# Patient Record
Sex: MALE | Race: WHITE | NOT HISPANIC OR LATINO | Employment: FULL TIME | URBAN - NONMETROPOLITAN AREA
[De-identification: names, ages, dates, MRNs, and addresses within clinical notes are randomized per-mention and may not be internally consistent; named-entity substitution may affect disease eponyms.]

---

## 2024-07-10 ENCOUNTER — LAB (OUTPATIENT)
Dept: FAMILY MEDICINE CLINIC | Facility: CLINIC | Age: 59
End: 2024-07-10
Payer: COMMERCIAL

## 2024-07-10 ENCOUNTER — OFFICE VISIT (OUTPATIENT)
Dept: FAMILY MEDICINE CLINIC | Facility: CLINIC | Age: 59
End: 2024-07-10
Payer: COMMERCIAL

## 2024-07-10 VITALS
TEMPERATURE: 98 F | SYSTOLIC BLOOD PRESSURE: 142 MMHG | RESPIRATION RATE: 18 BRPM | OXYGEN SATURATION: 98 % | WEIGHT: 217 LBS | HEART RATE: 83 BPM | DIASTOLIC BLOOD PRESSURE: 88 MMHG

## 2024-07-10 DIAGNOSIS — Z23 NEED FOR ZOSTER VACCINATION: ICD-10-CM

## 2024-07-10 DIAGNOSIS — K92.1 BLOOD IN STOOL: ICD-10-CM

## 2024-07-10 DIAGNOSIS — Z12.5 PROSTATE CANCER SCREENING: ICD-10-CM

## 2024-07-10 DIAGNOSIS — Z00.00 WELL ADULT EXAM: ICD-10-CM

## 2024-07-10 DIAGNOSIS — K59.04 CHRONIC IDIOPATHIC CONSTIPATION: ICD-10-CM

## 2024-07-10 DIAGNOSIS — Z00.00 WELL ADULT EXAM: Primary | ICD-10-CM

## 2024-07-10 DIAGNOSIS — Z12.11 COLON CANCER SCREENING: ICD-10-CM

## 2024-07-10 LAB
BILIRUB UR QL STRIP: NEGATIVE
CLARITY UR: ABNORMAL
COLOR UR: YELLOW
DEPRECATED RDW RBC AUTO: 45.9 FL (ref 37–54)
ERYTHROCYTE [DISTWIDTH] IN BLOOD BY AUTOMATED COUNT: 13.4 % (ref 12.3–15.4)
GLUCOSE UR STRIP-MCNC: NEGATIVE MG/DL
HBA1C MFR BLD: 5.6 % (ref 4.8–5.6)
HCT VFR BLD AUTO: 45.5 % (ref 37.5–51)
HCV AB SER QL: NORMAL
HGB BLD-MCNC: 15.1 G/DL (ref 13–17.7)
HGB UR QL STRIP.AUTO: NEGATIVE
KETONES UR QL STRIP: NEGATIVE
LEUKOCYTE ESTERASE UR QL STRIP.AUTO: NEGATIVE
MCH RBC QN AUTO: 30.8 PG (ref 26.6–33)
MCHC RBC AUTO-ENTMCNC: 33.2 G/DL (ref 31.5–35.7)
MCV RBC AUTO: 92.7 FL (ref 79–97)
NITRITE UR QL STRIP: NEGATIVE
PH UR STRIP.AUTO: 7 [PH] (ref 5–8)
PLATELET # BLD AUTO: 314 10*3/MM3 (ref 140–450)
PMV BLD AUTO: 8.8 FL (ref 6–12)
PROT UR QL STRIP: NEGATIVE
PSA SERPL-MCNC: 0.5 NG/ML (ref 0–4)
RBC # BLD AUTO: 4.91 10*6/MM3 (ref 4.14–5.8)
SP GR UR STRIP: 1.02 (ref 1–1.03)
TSH SERPL DL<=0.05 MIU/L-ACNC: 1.77 UIU/ML (ref 0.27–4.2)
UROBILINOGEN UR QL STRIP: ABNORMAL
VIT B12 BLD-MCNC: 747 PG/ML (ref 211–946)
WBC NRBC COR # BLD AUTO: 8.21 10*3/MM3 (ref 3.4–10.8)

## 2024-07-10 PROCEDURE — 99213 OFFICE O/P EST LOW 20 MIN: CPT | Performed by: FAMILY MEDICINE

## 2024-07-10 PROCEDURE — 82607 VITAMIN B-12: CPT | Performed by: FAMILY MEDICINE

## 2024-07-10 PROCEDURE — 99386 PREV VISIT NEW AGE 40-64: CPT | Performed by: FAMILY MEDICINE

## 2024-07-10 PROCEDURE — 80061 LIPID PANEL: CPT | Performed by: FAMILY MEDICINE

## 2024-07-10 PROCEDURE — 36415 COLL VENOUS BLD VENIPUNCTURE: CPT | Performed by: FAMILY MEDICINE

## 2024-07-10 PROCEDURE — 86803 HEPATITIS C AB TEST: CPT | Performed by: FAMILY MEDICINE

## 2024-07-10 PROCEDURE — G0103 PSA SCREENING: HCPCS | Performed by: FAMILY MEDICINE

## 2024-07-10 PROCEDURE — 84466 ASSAY OF TRANSFERRIN: CPT | Performed by: FAMILY MEDICINE

## 2024-07-10 PROCEDURE — 82728 ASSAY OF FERRITIN: CPT | Performed by: FAMILY MEDICINE

## 2024-07-10 PROCEDURE — 81003 URINALYSIS AUTO W/O SCOPE: CPT | Performed by: FAMILY MEDICINE

## 2024-07-10 PROCEDURE — 83540 ASSAY OF IRON: CPT | Performed by: FAMILY MEDICINE

## 2024-07-10 PROCEDURE — 83036 HEMOGLOBIN GLYCOSYLATED A1C: CPT | Performed by: FAMILY MEDICINE

## 2024-07-10 PROCEDURE — 80050 GENERAL HEALTH PANEL: CPT | Performed by: FAMILY MEDICINE

## 2024-07-10 RX ORDER — POLYETHYLENE GLYCOL 3350 17 G/17G
17 POWDER, FOR SOLUTION ORAL DAILY
Qty: 850 G | Refills: 11 | Status: SHIPPED | OUTPATIENT
Start: 2024-07-10

## 2024-07-10 NOTE — PROGRESS NOTES
Male Physical Note      Date: 07/10/2024   Patient Name: James Jalloh  : 1965   MRN: [id number]     Chief Complaint:    Chief Complaint   Patient presents with    Establish Care    Hemorrhoids     Hard stool and blood then had had soft stool and blood. States that blood was bright red        History of Present Illness: James Jalloh is a 59 y.o. male who is here today for their annual health maintenance and physical.  Patient has had some problems with hard stool with blood noted.  He states that the blood was on the outside of the stool and that it is bright red.  He will occasionally have it when he does wipe.  Patient does have a long history of having constipation and states that this is intermittent.  He states that sometimes he will have some diarrhea.  He denies any other problems such as bruising.  He has not had any nausea or vomiting.  He has not had any hematemesis.  Patient appears to be doing otherwise well.  They have continue with their medications without any side effects.  They have not had any changes in their usual activity, appetite and sleep.  Patient denies any other cardiovascular, respiratory, gastrointestinal, urologic or neurologic complaints.      Subjective      Review of Systems:   Review of Systems   Constitutional:  Negative for activity change, appetite change and fatigue.   Respiratory:  Negative for cough, chest tightness, shortness of breath and wheezing.    Cardiovascular:  Negative for chest pain, palpitations and leg swelling.   Gastrointestinal:  Positive for blood in stool and constipation. Negative for abdominal distention, abdominal pain, diarrhea, nausea, vomiting, GERD and indigestion.   Genitourinary:  Negative for dysuria, flank pain, frequency and urgency.   Musculoskeletal:  Negative for arthralgias and myalgias.   Neurological:  Negative for dizziness, tremors, seizures, syncope, weakness, light-headedness, numbness, headache and memory problem.    Psychiatric/Behavioral:  Negative for sleep disturbance and depressed mood. The patient is not nervous/anxious.        Past Medical History, Social History, Family History and Care Team were all reviewed with patient and updated as appropriate.     Medications:     Current Outpatient Medications:     polyethylene glycol (MiraLax) 17 GM/SCOOP powder, Take 17 g by mouth Daily., Disp: 850 g, Rfl: 11    Allergies:   Allergies   Allergen Reactions    Nsaids Unknown - Low Severity       Immunizations:  Health Maintenance Summary            Overdue - COLORECTAL CANCER SCREENING (View Topic Details) Never done      No completion, postpone, or frequency change history exists for this topic.              Overdue - ZOSTER VACCINE (1 of 2) Never done      No completion, postpone, or frequency change history exists for this topic.              Postponed - COVID-19 Vaccine (3 - 2023-24 season) Postponed until 7/9/2025      07/10/2024  Postponed until 7/9/2025 by Mario Queen MD (Patient Refused)    09/29/2021  Imm Admin: COVID-19 (PFIZER) Purple Cap Monovalent    08/26/2021  Imm Admin: COVID-19 (PFIZER) Purple Cap Monovalent              INFLUENZA VACCINE (Yearly - August to March) Next due on 8/1/2024      10/24/2022  Imm Admin: Fluzone (or Fluarix & Flulaval for VFC) >6mos              ANNUAL PHYSICAL (Yearly) Next due on 7/10/2025      07/10/2024  Done              TDAP/TD VACCINES (2 - Td or Tdap) Next due on 4/20/2032 04/20/2022  Imm Admin: Tdap              HEPATITIS C SCREENING  Completed      07/10/2024  Hepatitis C Antibody              Pneumococcal Vaccine 0-64 (Series Information) Aged Out      No completion, postpone, or frequency change history exists for this topic.                    No orders of the defined types were placed in this encounter.      Colorectal Screening:   Ordered.  Last Completed Colonoscopy       This patient has no relevant Health Maintenance data.          CT for Smoker  "(Age 50-80, 20pk yr within last 15 years): Deferred.  Bone Density/DEXA (high risk): Deferred.  Hep C (Age 18-79 once): Ordered.  HIV (Age 15-65 once) : Deferred.  PSA (Over age 50, C Level Recommendation): Ordered.  US Aorta (For male smokers, age 65): Deferred.  A1c:   Hemoglobin A1C   Date Value Ref Range Status   07/10/2024 5.60 4.80 - 5.60 % Final     Lipid panel: No results found for: \"LABLIPI\"    The 10-year ASCVD risk score (Jaelyn WAGGONER, et al., 2019) is: 8.1%    Values used to calculate the score:      Age: 59 years      Sex: Male      Is Non- : No      Diabetic: No      Tobacco smoker: No      Systolic Blood Pressure: 142 mmHg      Is BP treated: No      HDL Cholesterol: 66 mg/dL      Total Cholesterol: 210 mg/dL    Dermatology: Advise if necessary.  Ophthalmologist: Advised.  Dentist: Advised.    Tobacco Use: Low Risk  (7/15/2024)    Patient History     Smoking Tobacco Use: Never     Smokeless Tobacco Use: Never     Passive Exposure: Not on file       Social History     Substance and Sexual Activity   Alcohol Use None        Social History     Substance and Sexual Activity   Drug Use Not on file        Diet/Physical activity: Well-balanced diet/daily exercise.    Sexual health: No issues at present time.    PHQ-2 Depression Screening  PHQ-9 Total Score: 0      Measures:   Advanced Care Planning:   Patient does not have an advance directive, information provided.    Smoking Cessation:   Non-smoker.     Objective     Physical Exam:  Vital Signs:   Vitals:    07/10/24 1134   BP: 142/88   BP Location: Right arm   Patient Position: Sitting   Cuff Size: Adult   Pulse: 83   Resp: 18   Temp: 98 °F (36.7 °C)   TempSrc: Temporal   SpO2: 98%   Weight: 98.4 kg (217 lb)     There is no height or weight on file to calculate BMI.     Physical Exam  Vitals and nursing note reviewed.   Constitutional:       Appearance: Normal appearance.   HENT:      Head: Normocephalic and atraumatic.      Nose: " Nose normal.      Mouth/Throat:      Pharynx: Oropharynx is clear.   Eyes:      Extraocular Movements: Extraocular movements intact.      Pupils: Pupils are equal, round, and reactive to light.   Neck:      Thyroid: No thyroid mass or thyromegaly.      Trachea: Trachea normal.   Cardiovascular:      Rate and Rhythm: Normal rate and regular rhythm.      Pulses: Normal pulses. No decreased pulses.      Heart sounds: Normal heart sounds.   Pulmonary:      Effort: Pulmonary effort is normal.      Breath sounds: Normal breath sounds.   Abdominal:      General: Abdomen is flat. Bowel sounds are normal.      Palpations: Abdomen is soft.      Tenderness: There is no abdominal tenderness.   Musculoskeletal:      Cervical back: Neck supple.      Right lower leg: No edema.      Left lower leg: No edema.   Lymphadenopathy:      Cervical: No cervical adenopathy.   Skin:     General: Skin is warm and dry.   Neurological:      General: No focal deficit present.      Mental Status: He is alert and oriented to person, place, and time.      Sensory: Sensation is intact.      Motor: Motor function is intact.      Coordination: Coordination is intact.   Psychiatric:         Attention and Perception: Attention normal.         Mood and Affect: Mood normal.         Speech: Speech normal.         Behavior: Behavior normal.          POCT Results (if applicable):   No results found for this or any previous visit.    Procedures    Assessment / Plan      Assessment/Plan:   Diagnoses and all orders for this visit:    1. Well adult exam (Primary)  Patient has been doing well since last being seen without any problems noted.  His anxiety/depression scores were noted.  We did discuss anticipatory guidance as well as safety concerns.  We will continue to monitor his symptoms and obtain laboratory data and will pursue and treat accordingly.  We will follow-up prior to his neck scheduled appointment if there are any abnormalities.  -     CBC (No  Diff); Future  -     Comprehensive Metabolic Panel; Future  -     Hemoglobin A1c; Future  -     Hepatitis C Antibody; Future  -     Lipid Panel; Future  -     Urinalysis without microscopic (no culture) - Urine, Clean Catch; Future  -     Vitamin B12; Future  -     TSH Rfx On Abnormal To Free T4; Future  -     Iron Profile; Future  -     Ferritin; Future    2. Prostate cancer screening  PSA will be obtained.  -     PSA Screen; Future    3. Chronic idiopathic constipation  Patient does have positive chronic constipation that has led up to some blood in his stool.  We will pursue with iron studies as well as treatment of his constipation with MiraLAX.  He will adjust the dose depending on his stool habits.  We will further pursue and will consider further evaluation treatment if necessary.  -     polyethylene glycol (MiraLax) 17 GM/SCOOP powder; Take 17 g by mouth Daily.  Dispense: 850 g; Refill: 11    4. Colon cancer screening  Patient will be referred to have a colonoscopy performed.  He has selected [facility].  -     Ambulatory Referral to Gastroenterology    5. Need for zoster vaccination  Patient states that he has had his tetanus booster.  He will contact us with the date.  We have also discussed the risk and benefits of the shingle vaccine.  We will send the request to the pharmacy.  -     Zoster Vac Recomb Adjuvanted 50 MCG/0.5ML reconstituted suspension; Inject 0.5 mL into the appropriate muscle as directed by prescriber 1 (One) Time for 1 dose.  Dispense: 1 each; Refill: 0    6.  Blood in stool   I do suspect that patient does have some hemorrhoids based on his symptomatology.  He does believe that he has hemorrhoids that he has noted.  He does not have any blood currently.  We will make arrangements for colonoscopy and will pursue and treat accordingly.  If he does have decrease in his iron studies we will be more aggressive about getting him into see the specialist.    Healthcare Maintenance:  Counseling  provided based on age appropriate USPSTF guidelines.  BMI cannot be calculated due to outdated height or weight values.  Please input a current height/weight in Vitals and re-renter [id number] in Note to pull in correct documentation based on BMI range.    James Yeimi voices understanding and acceptance of this advice and will call back with any further questions or concerns. AVS with preventive healthcare tips printed for patient.     Follow Up:   Return if symptoms worsen or fail to improve.    At [facility], we believe that sharing information builds trust and better relationships. You are receiving this note because you recently visited [facility]. It is possible you will see health information before a provider has talked with you about it. This kind of information can be easy to misunderstand. To help you fully understand what it means for your health, we urge you to discuss this note with your provider.    Mario Queen MD  [facility]

## 2024-07-11 ENCOUNTER — TELEPHONE (OUTPATIENT)
Dept: FAMILY MEDICINE CLINIC | Facility: CLINIC | Age: 59
End: 2024-07-11
Payer: COMMERCIAL

## 2024-07-11 LAB
ALBUMIN SERPL-MCNC: 4.6 G/DL (ref 3.5–5.2)
ALBUMIN/GLOB SERPL: 1.6 G/DL
ALP SERPL-CCNC: 83 U/L (ref 39–117)
ALT SERPL W P-5'-P-CCNC: 25 U/L (ref 1–41)
ANION GAP SERPL CALCULATED.3IONS-SCNC: 12.5 MMOL/L (ref 5–15)
AST SERPL-CCNC: 21 U/L (ref 1–40)
BILIRUB SERPL-MCNC: 0.3 MG/DL (ref 0–1.2)
BUN SERPL-MCNC: 19 MG/DL (ref 6–20)
BUN/CREAT SERPL: 13 (ref 7–25)
CALCIUM SPEC-SCNC: 10.3 MG/DL (ref 8.6–10.5)
CHLORIDE SERPL-SCNC: 104 MMOL/L (ref 98–107)
CHOLEST SERPL-MCNC: 210 MG/DL (ref 0–200)
CO2 SERPL-SCNC: 24.5 MMOL/L (ref 22–29)
CREAT SERPL-MCNC: 1.46 MG/DL (ref 0.76–1.27)
EGFRCR SERPLBLD CKD-EPI 2021: 55.1 ML/MIN/1.73
FERRITIN SERPL-MCNC: 165 NG/ML (ref 30–400)
GLOBULIN UR ELPH-MCNC: 2.9 GM/DL
GLUCOSE SERPL-MCNC: 92 MG/DL (ref 65–99)
HDLC SERPL-MCNC: 66 MG/DL (ref 40–60)
IRON 24H UR-MRATE: 93 MCG/DL (ref 59–158)
IRON SATN MFR SERPL: 26 % (ref 20–50)
LDLC SERPL CALC-MCNC: 127 MG/DL (ref 0–100)
LDLC/HDLC SERPL: 1.89 {RATIO}
POTASSIUM SERPL-SCNC: 4.1 MMOL/L (ref 3.5–5.2)
PROT SERPL-MCNC: 7.5 G/DL (ref 6–8.5)
SODIUM SERPL-SCNC: 141 MMOL/L (ref 136–145)
TIBC SERPL-MCNC: 362 MCG/DL (ref 298–536)
TRANSFERRIN SERPL-MCNC: 243 MG/DL (ref 200–360)
TRIGL SERPL-MCNC: 95 MG/DL (ref 0–150)
VLDLC SERPL-MCNC: 17 MG/DL (ref 5–40)

## 2024-07-11 NOTE — TELEPHONE ENCOUNTER
Patient wanted to let us know that he had drank a protein shake with creatine in it. He thinks it may have altered his labs He does not want to start cholesterol medicine at this time. He would like to try to fix it with diet. You mentioned in his lab result that we would re check his liver enzymes. He wanted to ask if they were elevated also.

## 2024-07-11 NOTE — TELEPHONE ENCOUNTER
----- Message from Mario Queen sent at 7/11/2024  5:40 AM EDT -----  Patient's labs do look pretty good thus far.  Kidney function test is slightly diminished.  He needs to push fluids and we will recheck in about 3 months.  He needs to schedule follow-up for 3 months.  His iron studies are appropriate.  He is not anemic.  His hemoglobin A1c, thyroid, B12, PSA excetra are appropriate.  His cholesterol is elevated.  He has a 10-year cardiovascular risk of 8.1%.  It is recommended to start a cholesterol-lowering agent at 7.5%.  He should probably start 1 to lower his cardiovascular risk.  Will he?  Let me know which pharmacy he wishes for me to see in the medication to.  We probably need to schedule follow-up in 3 months to not only follow-up his renal function but also to recheck his cholesterol and liver.  Please let me know about his decision with respect to the cholesterol medication.

## 2024-07-12 NOTE — TELEPHONE ENCOUNTER
PATIENT WAS INFORMED. HE REITERATED THAT HE TAKES CREATINE AND THAT PROBABLY ALTERED HIS CREATININE. HE STATES THAT HE WILL TRY NOT TO TAKE THAT NEXT TIME.

## 2024-07-25 ENCOUNTER — TELEPHONE (OUTPATIENT)
Dept: FAMILY MEDICINE CLINIC | Facility: CLINIC | Age: 59
End: 2024-07-25
Payer: COMMERCIAL